# Patient Record
(demographics unavailable — no encounter records)

---

## 2024-11-20 NOTE — PHYSICAL EXAM
[General Appearance - Alert] : alert [General Appearance - In No Acute Distress] : in no acute distress [General Appearance - Well Nourished] : well nourished [General Appearance - Well Developed] : well developed [Sclera] : the sclera and conjunctiva were normal [PERRL With Normal Accommodation] : pupils were equal in size, round, and reactive to light [Extraocular Movements] : extraocular movements were intact [Both Tympanic Membranes Were Examined] : both tympanic membranes were normal [Neck Appearance] : the appearance of the neck was normal [] : no respiratory distress [Respiration, Rhythm And Depth] : normal respiratory rhythm and effort [Exaggerated Use Of Accessory Muscles For Inspiration] : no accessory muscle use [Auscultation Breath Sounds / Voice Sounds] : lungs were clear to auscultation bilaterally [Heart Sounds] : normal S1 and S2 [Heart Sounds Pericardial Friction Rub] : no pericardial rub [Arterial Pulses Carotid] : carotid pulses were normal with no bruits [Bowel Sounds] : normal bowel sounds [Abdomen Soft] : soft [Abdomen Tenderness] : non-tender [No CVA Tenderness] : no ~M costovertebral angle tenderness [Musculoskeletal - Swelling] : no joint swelling seen [Skin Color & Pigmentation] : normal skin color and pigmentation [Motor Exam] : the motor exam was normal [Oriented To Time, Place, And Person] : oriented to person, place, and time [Impaired Insight] : insight and judgment were intact [Affect] : the affect was normal [Mood] : the mood was normal [FreeTextEntry1] : 2-3 + edema bilaterally lower extremities

## 2024-11-20 NOTE — HISTORY OF PRESENT ILLNESS
[FreeTextEntry1] : Patient with history of hypertension for over 10 years, atrial fibrillation, hyperlipidemia and lower extremity edema for two years with elevation in creatinine.  Labs from end of 2023 showed similar elevation.  She is on two diuretics at this time which are for management of heart and edema.  Patient was unaware of abnormal renal function.  Denied routine use of NSAIDs.  No known proteinuria.  She is a former smoker.  Edema of her extremities is reported to have started two years ago after leg operation.  Evaluation showed slightly increased kappa light chains in serum with normal UPEP.    Renal ultrasound 9/4/24 - right 8.3 cm, left 8.8 cm with 5.7 cm simple cyst mid to upper pole.  Normal echogenicity bilaterally.

## 2024-11-20 NOTE — ASSESSMENT
[FreeTextEntry1] : 1. CKD 3 - May represent intravascular volume contraction/pre-renal from use of diuretics.  BUN/creatinine ratio is approximately 40 with serum uric acid 10.3. Creatinine currently 1.39.  Remains on Spironolactone and Torsemide at the direction of Cardiology.  Will repeat labs prior to next visit including serum uric acid.  May need addition of uric acid lowering agent if persists.  Most recent potassium level was 5.2 with patient on oral potassium replacement.  This may need to be discontinued if sequential potassium levels increase further.  UPEP, IPEP and urine albumin normal.  Will repeat free light chains prior to next visit (slight elevation in free kappa but not anemic with normal UPEP).  2. Hypertension - monitor on current regimen.  Adjustments depending upon course and evaluation 3. Edema of lower extremities - appears to require ongoing diuretics.  Need to balance dosing with renal function. 4. Renal cyst - ultrasound as noted above.  Will monitor every other year.  5. Hyponatremia - most recent labs showed level of 137.  Monitor sequential labs.
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
## 2025-07-02 NOTE — ASSESSMENT
[FreeTextEntry1] : 1. CKD 3 - May represent intravascular volume contraction/pre-renal from use of diuretics.  BUN/creatinine ratio is approximately 33 with serum uric acid 10.4. Creatinine currently 1.77.  Remains on Spironolactone and Torsemide at the direction of Cardiology.  Will repeat labs in 4 weeks and prior to next visit including serum uric acid.  May need addition of uric acid lowering agent if persists.  Most recent potassium level was 4.8 with patient on oral potassium replacement despite her CKD, use of ACEI and Spironolactone.  Review with next set of labs including serum magnesium.  UPEP, IPEP and urine albumin normal.  Repeat FLC now with elevated kappa/lambda ratio.  SPEP to be reviewed with next labs in 4 weeks. Would recommend Heme review for a likely MGUS. 2. Hypertension - monitor on current regimen.  Adjustments depending upon course and evaluation 3. Edema of lower extremities - appears to require ongoing diuretics.  Need to balance dosing with renal function. 4. Renal cyst - ultrasound as noted above.  Will monitor every other year.  5. Hyponatremia - most recent labs showed level of 138.  Monitor sequential labs.  6. Proteinuria - 461 mg per day with negative U/A and previously negative UPEP.  Most consistent with tubular proteinuria.   7. Hyperuricemia - as noted above, uric acid elevated, potentially related to diuretics.  May need lowering agent based on trend.